# Patient Record
Sex: MALE | Race: WHITE | Employment: FULL TIME | URBAN - METROPOLITAN AREA
[De-identification: names, ages, dates, MRNs, and addresses within clinical notes are randomized per-mention and may not be internally consistent; named-entity substitution may affect disease eponyms.]

---

## 2018-11-09 ENCOUNTER — HOSPITAL ENCOUNTER (EMERGENCY)
Age: 33
Discharge: HOME | End: 2018-11-09
Payer: COMMERCIAL

## 2018-11-09 VITALS
OXYGEN SATURATION: 98 % | TEMPERATURE: 98.5 F | HEART RATE: 84 BPM | RESPIRATION RATE: 16 BRPM | WEIGHT: 162 LBS | BODY MASS INDEX: 23.99 KG/M2 | SYSTOLIC BLOOD PRESSURE: 140 MMHG | HEIGHT: 69 IN | DIASTOLIC BLOOD PRESSURE: 94 MMHG

## 2018-11-09 DIAGNOSIS — S61.511A WRIST LACERATION, RIGHT, INITIAL ENCOUNTER: Primary | ICD-10-CM

## 2018-11-09 DIAGNOSIS — Z23 NEED FOR TETANUS BOOSTER: ICD-10-CM

## 2018-11-09 PROCEDURE — 2500000003 HC RX 250 WO HCPCS: Performed by: NURSE PRACTITIONER

## 2018-11-09 PROCEDURE — 12001 RPR S/N/AX/GEN/TRNK 2.5CM/<: CPT

## 2018-11-09 PROCEDURE — 90471 IMMUNIZATION ADMIN: CPT | Performed by: NURSE PRACTITIONER

## 2018-11-09 PROCEDURE — 90715 TDAP VACCINE 7 YRS/> IM: CPT | Performed by: NURSE PRACTITIONER

## 2018-11-09 PROCEDURE — 6360000002 HC RX W HCPCS: Performed by: NURSE PRACTITIONER

## 2018-11-09 PROCEDURE — 99282 EMERGENCY DEPT VISIT SF MDM: CPT

## 2018-11-09 PROCEDURE — 6370000000 HC RX 637 (ALT 250 FOR IP)

## 2018-11-09 RX ORDER — DIAPER,BRIEF,INFANT-TODD,DISP
EACH MISCELLANEOUS
Status: COMPLETED
Start: 2018-11-09 | End: 2018-11-09

## 2018-11-09 RX ORDER — LIDOCAINE HYDROCHLORIDE 10 MG/ML
20 INJECTION, SOLUTION INFILTRATION; PERINEURAL ONCE
Status: COMPLETED | OUTPATIENT
Start: 2018-11-09 | End: 2018-11-09

## 2018-11-09 RX ORDER — DIAPER,BRIEF,INFANT-TODD,DISP
EACH MISCELLANEOUS ONCE
Status: COMPLETED | OUTPATIENT
Start: 2018-11-09 | End: 2018-11-09

## 2018-11-09 RX ADMIN — Medication 1 G: at 16:34

## 2018-11-09 RX ADMIN — TETANUS TOXOID, REDUCED DIPHTHERIA TOXOID AND ACELLULAR PERTUSSIS VACCINE, ADSORBED 0.5 ML: 5; 2.5; 8; 8; 2.5 SUSPENSION INTRAMUSCULAR at 15:27

## 2018-11-09 RX ADMIN — BACITRACIN ZINC 1 G: 500 OINTMENT TOPICAL at 16:34

## 2018-11-09 RX ADMIN — LIDOCAINE HYDROCHLORIDE 20 ML: 10 INJECTION, SOLUTION INFILTRATION; PERINEURAL at 15:27

## 2018-11-09 ASSESSMENT — PAIN SCALES - GENERAL: PAINLEVEL_OUTOF10: 0

## 2018-12-14 ENCOUNTER — APPOINTMENT (OUTPATIENT)
Dept: GENERAL RADIOLOGY | Age: 33
End: 2018-12-14
Payer: COMMERCIAL

## 2018-12-14 ENCOUNTER — HOSPITAL ENCOUNTER (EMERGENCY)
Age: 33
Discharge: HOME | End: 2018-12-14
Attending: EMERGENCY MEDICINE
Payer: COMMERCIAL

## 2018-12-14 VITALS
TEMPERATURE: 98.1 F | HEIGHT: 69 IN | OXYGEN SATURATION: 98 % | WEIGHT: 155 LBS | SYSTOLIC BLOOD PRESSURE: 108 MMHG | HEART RATE: 86 BPM | BODY MASS INDEX: 22.96 KG/M2 | RESPIRATION RATE: 16 BRPM | DIASTOLIC BLOOD PRESSURE: 64 MMHG

## 2018-12-14 DIAGNOSIS — S29.019A THORACIC MYOFASCIAL STRAIN, INITIAL ENCOUNTER: Primary | ICD-10-CM

## 2018-12-14 PROCEDURE — 99283 EMERGENCY DEPT VISIT LOW MDM: CPT

## 2018-12-14 PROCEDURE — 71045 X-RAY EXAM CHEST 1 VIEW: CPT

## 2018-12-14 PROCEDURE — 6370000000 HC RX 637 (ALT 250 FOR IP): Performed by: EMERGENCY MEDICINE

## 2018-12-14 RX ORDER — ACETAMINOPHEN 500 MG
500 TABLET ORAL EVERY 6 HOURS PRN
Qty: 120 TABLET | Refills: 3 | Status: SHIPPED | OUTPATIENT
Start: 2018-12-14 | End: 2019-11-20 | Stop reason: ALTCHOICE

## 2018-12-14 RX ORDER — IBUPROFEN 800 MG/1
800 TABLET ORAL EVERY 6 HOURS PRN
Qty: 20 TABLET | Refills: 3 | Status: SHIPPED | OUTPATIENT
Start: 2018-12-14 | End: 2019-11-20 | Stop reason: ALTCHOICE

## 2018-12-14 RX ORDER — CYCLOBENZAPRINE HCL 10 MG
10 TABLET ORAL 3 TIMES DAILY PRN
Qty: 10 TABLET | Refills: 0 | Status: SHIPPED | OUTPATIENT
Start: 2018-12-14 | End: 2018-12-24

## 2018-12-14 RX ORDER — IBUPROFEN 800 MG/1
800 TABLET ORAL ONCE
Status: COMPLETED | OUTPATIENT
Start: 2018-12-14 | End: 2018-12-14

## 2018-12-14 RX ORDER — ACETAMINOPHEN 500 MG
1000 TABLET ORAL ONCE
Status: COMPLETED | OUTPATIENT
Start: 2018-12-14 | End: 2018-12-14

## 2018-12-14 RX ADMIN — IBUPROFEN 800 MG: 800 TABLET, FILM COATED ORAL at 08:19

## 2018-12-14 RX ADMIN — ACETAMINOPHEN 1000 MG: 500 TABLET ORAL at 08:19

## 2018-12-14 ASSESSMENT — ENCOUNTER SYMPTOMS
COUGH: 0
BOWEL INCONTINENCE: 0
DIARRHEA: 0
BACK PAIN: 1
CONSTIPATION: 0
VOMITING: 0
BLOOD IN STOOL: 0
SHORTNESS OF BREATH: 0
WHEEZING: 0
NAUSEA: 0
ABDOMINAL PAIN: 0

## 2018-12-14 ASSESSMENT — PAIN DESCRIPTION - FREQUENCY: FREQUENCY: CONTINUOUS

## 2018-12-14 ASSESSMENT — PAIN SCALES - GENERAL
PAINLEVEL_OUTOF10: 7
PAINLEVEL_OUTOF10: 7

## 2018-12-14 ASSESSMENT — PAIN DESCRIPTION - ORIENTATION: ORIENTATION: RIGHT

## 2018-12-14 ASSESSMENT — PAIN DESCRIPTION - PROGRESSION: CLINICAL_PROGRESSION: NOT CHANGED

## 2018-12-14 ASSESSMENT — PAIN DESCRIPTION - LOCATION: LOCATION: BACK

## 2018-12-14 ASSESSMENT — PAIN DESCRIPTION - PAIN TYPE: TYPE: ACUTE PAIN

## 2018-12-14 ASSESSMENT — PAIN DESCRIPTION - ONSET: ONSET: SUDDEN

## 2018-12-14 NOTE — ED PROVIDER NOTES
reviewed and are negative. Physical Exam   Constitutional: He is oriented to person, place, and time. He appears well-developed and well-nourished. HENT:   Head: Normocephalic and atraumatic. Eyes: Pupils are equal, round, and reactive to light. Neck: Normal range of motion. Neck supple. Cardiovascular: Normal rate, regular rhythm, normal heart sounds and intact distal pulses. No murmur heard. Pulmonary/Chest: Effort normal and breath sounds normal. No respiratory distress. He has no wheezes. He has no rales. Abdominal: Soft. Bowel sounds are normal. There is no tenderness. There is no rebound and no guarding. Musculoskeletal: He exhibits no edema. Neurological: He is alert and oriented to person, place, and time. Skin: Skin is warm and dry. Capillary refill takes less than 2 seconds. Nursing note and vitals reviewed. Wells' Criteria for PE (possible score 0 to 12.5)       Clinical Signs & Symptoms of DVT   No      PE is #1 Dx or Equally Likely   No      Heart Rate >100   No      Immobillization at least 3 days or     Surgery in past 4 Weeks   No      Previous Diagnosed PE or DVT   No      Hemoptysis   No      Malignancy w/Tx in Past 6 Months or     Palliative Tx   No      TOTAL SCORE   0     Low Risk Group: 0-1.5 =  1.3-3 % incidence of PE  Mod Risk Group: 2-3.5 =  16.2 % Incidence of PE  Mod Risk Group: > 4 = 28% Incidence of PE  High Risk Group >5 = 40.6 Incidence of      Procedures  --------------------------------------------- PAST HISTORY ---------------------------------------------  Past Medical History:  has no past medical history on file. Past Surgical History:  has a past surgical history that includes Wrist surgery and fracture surgery. Social History:  reports that he has been smoking. He has been smoking about 0.20 packs per day. He has never used smokeless tobacco. He reports that he drinks alcohol. He reports that he does not use drugs.     Family History: family history is not on file. The patients home medications have been reviewed. Allergies: Patient has no known allergies. -------------------------------------------------- RESULTS -------------------------------------------------  Labs:  No results found for this visit on 12/14/18. Radiology:  XR CHEST PORTABLE   Final Result   normal chest unchanged since May 21, 2008.                         ------------------------- NURSING NOTES AND VITALS REVIEWED ---------------------------  Date / Time Roomed:  12/14/2018  6:51 AM  ED Bed Assignment:  12/12    The nursing notes within the ED encounter and vital signs as below have been reviewed. /64   Pulse 86   Temp 98.1 °F (36.7 °C) (Oral)   Resp 16   Ht 5' 9\" (1.753 m)   Wt 155 lb (70.3 kg)   SpO2 98%   BMI 22.89 kg/m²   Oxygen Saturation Interpretation: Normal      ------------------------------------------ PROGRESS NOTES ------------------------------------------  ED Course as of Dec 14 0803   Fri Dec 14, 2018   0802 Patient has myofascial thoracic strain from right side of the back. Chest x-ray showed no abnormality. Patient was given strength anti-inflammatories for pain. We discharged with the same plus muscle relaxer to use at night if the pain is so bad that he cannot sleep. Instructed to follow-up with corporate care as this was a work-related injury. The patient is nontoxic appearing and stable for outpatient treatment and management. They were instructed to follow-up with their primary care physician and were given warning signs to return to the ED. All of their questions were answered at this time and are agreeable with discharge and early follow up. [BM]      ED Course User Index  [BM] Brian Krueger,          8:00 AM  I have spoken with the patient and discussed todays results, in addition to providing specific details for the plan of care and counseling regarding the diagnosis and prognosis.   Their questions are answered at this

## 2019-11-20 ENCOUNTER — TELEPHONE (OUTPATIENT)
Dept: ADMINISTRATIVE | Age: 34
End: 2019-11-20

## 2019-11-20 ENCOUNTER — HOSPITAL ENCOUNTER (EMERGENCY)
Age: 34
Discharge: HOME | End: 2019-11-20
Attending: EMERGENCY MEDICINE
Payer: COMMERCIAL

## 2019-11-20 ENCOUNTER — APPOINTMENT (OUTPATIENT)
Dept: GENERAL RADIOLOGY | Age: 34
End: 2019-11-20
Payer: COMMERCIAL

## 2019-11-20 VITALS
HEIGHT: 69 IN | BODY MASS INDEX: 24.44 KG/M2 | OXYGEN SATURATION: 99 % | SYSTOLIC BLOOD PRESSURE: 132 MMHG | TEMPERATURE: 98.3 F | HEART RATE: 90 BPM | WEIGHT: 165 LBS | DIASTOLIC BLOOD PRESSURE: 72 MMHG | RESPIRATION RATE: 16 BRPM

## 2019-11-20 DIAGNOSIS — S46.912A STRAIN OF LEFT SHOULDER, INITIAL ENCOUNTER: Primary | ICD-10-CM

## 2019-11-20 PROCEDURE — 73030 X-RAY EXAM OF SHOULDER: CPT

## 2019-11-20 PROCEDURE — 99283 EMERGENCY DEPT VISIT LOW MDM: CPT

## 2020-02-26 ENCOUNTER — OFFICE VISIT (OUTPATIENT)
Dept: FAMILY MEDICINE CLINIC | Age: 35
End: 2020-02-26
Payer: COMMERCIAL

## 2020-02-26 VITALS
RESPIRATION RATE: 18 BRPM | SYSTOLIC BLOOD PRESSURE: 112 MMHG | HEIGHT: 69 IN | TEMPERATURE: 98.2 F | BODY MASS INDEX: 26.87 KG/M2 | WEIGHT: 181.4 LBS | HEART RATE: 84 BPM | DIASTOLIC BLOOD PRESSURE: 70 MMHG | OXYGEN SATURATION: 99 %

## 2020-02-26 PROCEDURE — 96372 THER/PROPH/DIAG INJ SC/IM: CPT | Performed by: FAMILY MEDICINE

## 2020-02-26 PROCEDURE — 99203 OFFICE O/P NEW LOW 30 MIN: CPT | Performed by: FAMILY MEDICINE

## 2020-02-26 RX ORDER — NAPROXEN 500 MG/1
500 TABLET ORAL 2 TIMES DAILY WITH MEALS
Qty: 180 TABLET | Refills: 1 | Status: SHIPPED
Start: 2020-02-26 | End: 2020-05-26

## 2020-02-26 RX ORDER — METHYLPREDNISOLONE 4 MG/1
TABLET ORAL
Qty: 1 KIT | Refills: 0 | Status: SHIPPED | OUTPATIENT
Start: 2020-02-26 | End: 2020-03-03

## 2020-02-26 RX ORDER — DEXAMETHASONE SODIUM PHOSPHATE 4 MG/ML
4 INJECTION, SOLUTION INTRA-ARTICULAR; INTRALESIONAL; INTRAMUSCULAR; INTRAVENOUS; SOFT TISSUE ONCE
Status: COMPLETED | OUTPATIENT
Start: 2020-02-26 | End: 2020-02-26

## 2020-02-26 RX ADMIN — DEXAMETHASONE SODIUM PHOSPHATE 4 MG: 4 INJECTION, SOLUTION INTRA-ARTICULAR; INTRALESIONAL; INTRAMUSCULAR; INTRAVENOUS; SOFT TISSUE at 15:59

## 2020-02-26 ASSESSMENT — PATIENT HEALTH QUESTIONNAIRE - PHQ9
2. FEELING DOWN, DEPRESSED OR HOPELESS: 0
SUM OF ALL RESPONSES TO PHQ9 QUESTIONS 1 & 2: 0
1. LITTLE INTEREST OR PLEASURE IN DOING THINGS: 0
SUM OF ALL RESPONSES TO PHQ QUESTIONS 1-9: 0
SUM OF ALL RESPONSES TO PHQ QUESTIONS 1-9: 0

## 2020-02-26 ASSESSMENT — ENCOUNTER SYMPTOMS
DIARRHEA: 0
EYE DISCHARGE: 0
TROUBLE SWALLOWING: 0
COUGH: 0
COLOR CHANGE: 0
NAUSEA: 0
VOICE CHANGE: 0
PHOTOPHOBIA: 0
BACK PAIN: 0
VOMITING: 0
EYE REDNESS: 0
CHOKING: 0
GASTROINTESTINAL NEGATIVE: 1
EYE PAIN: 0
SHORTNESS OF BREATH: 0
CONSTIPATION: 0
EYE ITCHING: 0
ALLERGIC/IMMUNOLOGIC NEGATIVE: 1
SINUS PRESSURE: 0
APNEA: 0
RECTAL PAIN: 0
STRIDOR: 0
SINUS PAIN: 0
SORE THROAT: 0
ABDOMINAL DISTENTION: 0
CHEST TIGHTNESS: 0
RESPIRATORY NEGATIVE: 1
BLOOD IN STOOL: 0
WHEEZING: 0
FACIAL SWELLING: 0
ANAL BLEEDING: 0
RHINORRHEA: 0
ABDOMINAL PAIN: 0

## 2020-02-26 NOTE — PATIENT INSTRUCTIONS
Patient Education        Rotator Cuff Rehabilitation  What is rotator cuff rehabilitation? Rotator cuff rehabilitation is a series of exercises you do after your surgery. It helps you get back your shoulder's range of motion and strength. You will work with your doctor and physical therapist to plan this exercise program. To get the best results, you need to do the exercises correctly and as often as your doctor tells you. Before you start any exercises, talk with your doctor or physical therapist. It is important that you know exactly how to do the exercises. Stop and call your doctor if you are not sure that you are doing the exercises correctly or if you have any pain. Hearing clicks and pops during exercise is not always cause for concern, but a grinding feeling may mean a more serious problem. Ice your shoulder after exercising if it is sore. Follow-up care is a key part of your treatment and safety. Be sure to make and go to all appointments, and call your doctor if you are having problems. It's also a good idea to know your test results and keep a list of the medicines you take. Stretching exercises  Do not start doing stretching exercises until your doctor says you can. Your doctor will tell you which exercises to do, and how often and how long to do them. Posterior stretch  · Stand upright with your feet shoulder-width apart. · Put the hand of your affected arm on the opposite shoulder, and hold the elbow to your body. · Then, using your good arm, hold the elbow of your affected arm and move it gently up, away from, and across your body. External rotation  · Hold a lightweight stick or niyah in your good arm. It should be about 2 feet long. A curtain niyah may work well. · Lie on your back with your elbows next to your sides. Rest the elbow of your affected arm on a small pillow or folded towel. · Set your arms so that the elbows are bent at a 90-degree angle, like the letter \"L. \" Your hands will 12 times. 4. When you first start out, don't hold any extra weight in your hand. As you get stronger, you may use a 1-pound to 2-pound dumbbell or a small can of food. Shoulder flexor and extensor exercise   1. Push forward (flex): Stand facing a wall or doorjamb, about 6 inches or less back. Hold your injured arm against your body. Make a closed fist with your thumb on top. Then gently push your hand forward into the wall with about 25% to 50% of your strength. Don't let your body move backward as you push. Hold for about 6 seconds. Relax for a few seconds. Repeat 8 to 12 times. 2. Push backward (extend): Stand with your back flat against a wall. Your upper arm should be against the wall, with your elbow bent 90 degrees (your hand straight ahead). Push your elbow gently back against the wall with about 25% to 50% of your strength. Don't let your body move forward as you push. Hold for about 6 seconds. Relax for a few seconds. Repeat 8 to 12 times. Scapular exercise: Wall push-ups   1. Stand facing a wall, about 12 inches to 18 inches away. 2. Place your hands on the wall at shoulder height. 3. Slowly bend your elbows and bring your face to the wall. Keep your back and hips straight. 4. Push back to where you started. 5. Repeat 8 to 12 times. 6. When you can do this exercise against a wall comfortably, you can try it against a counter. You can then slowly progress to the end of a couch, then to a sturdy chair, and finally to the floor. Scapular exercise: Retraction   1. Put the band around a solid object at about waist level. (A bedpost will work well.) Each hand should hold an end of the band. 2. With your elbows at your sides and bent to 90 degrees, pull the band back. Your shoulder blades should move toward each other. Then move your arms back where you started. 3. Repeat 8 to 12 times.   4. If you have good range of motion in your shoulders, try this exercise with your arms lifted out to the sides. Keep your elbows at a 90-degree angle. Raise the elastic band up to about shoulder level. Pull the band back to move your shoulder blades toward each other. Then move your arms back where you started. Internal rotator strengthening exercise   1. Start by tying a piece of elastic exercise material to a doorknob. You can use surgical tubing or Thera-Band. 2. Stand or sit with your shoulder relaxed and your elbow bent 90 degrees. Your upper arm should rest comfortably against your side. Squeeze a rolled towel between your elbow and your body for comfort. This will help keep your arm at your side. 3. Hold one end of the elastic band in the hand of the painful arm. 4. Slowly rotate your forearm toward your body until it touches your belly. Slowly move it back to where you started. 5. Keep your elbow and upper arm firmly tucked against the towel roll or at your side. 6. Repeat 8 to 12 times. External rotator strengthening exercise   1. Start by tying a piece of elastic exercise material to a doorknob. You can use surgical tubing or Thera-Band. (You may also hold one end of the band in each hand.)  2. Stand or sit with your shoulder relaxed and your elbow bent 90 degrees. Your upper arm should rest comfortably against your side. Squeeze a rolled towel between your elbow and your body for comfort. This will help keep your arm at your side. 3. Hold one end of the elastic band with the hand of the painful arm. 4. Start with your forearm across your belly. Slowly rotate the forearm out away from your body. Keep your elbow and upper arm tucked against the towel roll or the side of your body until you begin to feel tightness in your shoulder. Slowly move your arm back to where you started. 5. Repeat 8 to 12 times. Follow-up care is a key part of your treatment and safety. Be sure to make and go to all appointments, and call your doctor if you are having problems.  It's also a good idea to know your test results and keep a list of the medicines you take. Where can you learn more? Go to https://chpepiceweb.[email or web address]. org and sign in to your [employer] account. Enter Boom Phelps in the [facility] box to learn more about \"Rotator Cuff: Exercises. \"     If you do not have an account, please click on the \"Sign Up Now\" link. Current as of: June 26, 2019  Content Version: 12.3  © 3585-5325 Healthwise, [employer]. Care instructions adapted under license by [facility] ([facility]). If you have questions about a medical condition or this instruction, always ask your healthcare professional. [place] [address] any warranty or liability for your use of this information. Patient Education        Lactose-Restricted Diet: Care Instructions  Your Care Instructions    Lactose is a sugar that is in milk and milk products. Some people do not make enough of an enzyme called lactase, which digests lactose. When this happens it can cause gas, belly pain, diarrhea, and bloating. This is called lactose intolerance. This is not the same as food allergy to milk. With planning, you can avoid lactose and still eat a tasty and nutritious diet and get enough calcium to maintain healthy bones. Your doctor and dietitian will help you design a diet based on your level of lactose intolerance and what you like to eat. Always talk with your doctor or dietitian before you make changes in your diet. Follow-up care is a key part of your treatment and safety. Be sure to make and go to all appointments, and call your doctor if you are having problems. It's also a good idea to know your test results and keep a list of the medicines you take. How can you care for yourself at home? · Limit the amount of milk and milk products in your diet. Spread small amounts of milk or milk products throughout the day, instead of larger amounts all at once.   ? If you have bad symptoms when you eat or drink something with lactose, you may need have bones you can eat. ? Calcium-fortified orange juice. ? Soy products such as fortified soy milk and tofu. ? Almonds. ? Dried beans. · If you are worried about getting enough nutrients, ask your doctor about taking supplements, such as calcium and vitamin D. When should you call for help? Call your doctor now or seek immediate medical care if:    · You have new or worse belly pain.    Watch closely for changes in your health, and be sure to contact your doctor if:    · You do not get better as expected. Where can you learn more? Go to https://WithingspeIntegrated [email or web address].[email or web address] org and sign in to your [employer] account. Enter Y809 in the [employer] box to learn more about \"Lactose-Restricted Diet: Care Instructions. \"     If you do not have an account, please click on the \"Sign Up Now\" link. Current as of: April 7, 2019  Content Version: 12.3  © 6984-2365 Healthwise, Inc[employer]. Care instructions adapted under license by [facility] ([facility]). If you have questions about a medical condition or this instruction, always ask your healthcare professional. [place] [address] any warranty or liability for your use of this information.

## 2020-02-26 NOTE — PROGRESS NOTES
Susana Farias  [address]  [place] [address]     Dr. Cristobal Heredia  [address] [place] [address]  ** Report to GI Services Building, Entrance F, Behind [facility]  ** Please call [phone] with any questions        Please follow these instructions. Disregard instructions that are provided on the medication package.    Date of Procedure: Wednesday, August 8, 2018  Check in at: 9:45am  Procedure at: 10:45am    Your laxative prescription (NuLytely) may be picked up from:  [facility]    Colonoscopy Preparation Instructions  ?  **Please make arrangements for a responsible adult to drive you home. (A responsible adult is someone age 18 or older who can receive and understand instructions, stay with you, and call for assistance as instructed).**    Regarding Your Medications Prior to Your Procedure:  • Do not take any iron or iron-containing multivitamins beginning five days prior to your procedure.    • Blood-thinners typically need to be stopped a few days prior to the procedure. Check with your prescribing physician if you take blood thinners such as Coumadin (warfarin), Pradaxa, Plavix, Eliquis, Xarelto or Brilinta.  • Do not take aspirin or anti-inflammatory medications (Advil, Motrin, ibuprofen, Aleve, naproxen), in the morning on the day of your procedure.  • If you are diabetic:  o Take half of your usual dose of insulin the night before. DO NOT take insulin the morning of the procedure.  o Hold oral diabetic medications the night before and the morning of the procedure.     The Day Before the Procedure:  • Start a clear liquid diet at breakfast. Continue a clear liquid diet for the entire day in unlimited amounts. Clear liquids are listed below.   • In the morning, add tap water to the laxative container, shake well and place in the refrigerator.   • Lemonade flavor Crystal Lite mix, obtained at the grocery store, can also be used to improve the flavor. Once water is  FLORESITA Licona is a 36 y.o. male. HPI/Chief C/O:  Chief Complaint   Patient presents with    Shoulder Pain     New patient; pt c/o L shoulder pain; would like to discuss second options incase workers comp gets denied      No Known Allergies  This patient is here to establish care as a new patient in our office  We will review all past medical history and go over past and current medications   We will review all medical records that are available to us  We will reconcile our care planning and treatment goals to past and present for this patient       Shoulder Pain    The pain is present in the left shoulder. This is a new problem. The current episode started more than 1 month ago. The problem occurs constantly. The problem has been gradually worsening. The quality of the pain is described as sharp, burning and aching. The pain is at a severity of 9/10. The pain is severe. Associated symptoms include an inability to bear weight and a limited range of motion. Pertinent negatives include no fever, itching, joint locking, joint swelling, numbness, stiffness or tingling. The symptoms are aggravated by activity. He has tried NSAIDS for the symptoms. Family history does not include gout or rheumatoid arthritis. There is no history of diabetes, gout, osteoarthritis or rheumatoid arthritis. ROS:  Review of Systems   Constitutional: Negative. Negative for activity change, appetite change, chills, diaphoresis, fatigue, fever and unexpected weight change. HENT: Negative. Negative for congestion, dental problem, drooling, ear discharge, ear pain, facial swelling, hearing loss, mouth sores, nosebleeds, postnasal drip, rhinorrhea, sinus pressure, sinus pain, sneezing, sore throat, tinnitus, trouble swallowing and voice change. Eyes: Negative for photophobia, pain, discharge, redness, itching and visual disturbance. Respiratory: Negative.   Negative for apnea, cough, choking, chest tightness, added, the solution is only good for 48 hours.   • At approximately 5 PM, (or when you are home for the evening), begin drinking the Nulytely solution. Drink 8 oz. Every 20 minutes until you have consumed the entire gallon of the solution.   • Walking will help the laxative move through the colon.     The Day of the Procedure:  • DO NOT TAKE AM MEDICATIONS  • Do not have anything by mouth after 8:45 AM.     Clear Liquid Diet:  Do not consume anything red or purple.    Beverages: soft drinks/soda, Gatorade or Fede-Aid, clear fruit juices without pulp, water, tea, coffee (no milk or non dairy creamer).   Broths: chicken, beef or vegetable.   Desserts: hard candies, Jell-O, Popsicles. (No fruit bars or sherbet)    If stools are not clear yellow the morning of the procedure, please call the GI lab at [phone].            wall: No tenderness. Abdominal:      General: Bowel sounds are normal. There is no distension. Palpations: Abdomen is soft. There is no mass. Tenderness: There is no abdominal tenderness. There is no right CVA tenderness, left CVA tenderness, guarding or rebound. Hernia: No hernia is present. Genitourinary:     Penis: Normal. No tenderness. Scrotum/Testes: Normal.      Prostate: Normal.      Rectum: Normal.   Musculoskeletal:         General: Tenderness (left shoulder pain) present. No swelling, deformity or signs of injury. Left shoulder: He exhibits decreased range of motion, tenderness, bony tenderness, pain, spasm and decreased strength. He exhibits no swelling, no effusion, no crepitus, no deformity, no laceration and normal pulse. Arms:       Right lower leg: No edema. Left lower leg: No edema. Lymphadenopathy:      Cervical: No cervical adenopathy. Skin:     General: Skin is warm. Coloration: Skin is not jaundiced or pale. Findings: No bruising, erythema, lesion or rash. Neurological:      General: No focal deficit present. Mental Status: He is alert and oriented to person, place, and time. Cranial Nerves: No cranial nerve deficit. Sensory: No sensory deficit. Motor: No weakness or abnormal muscle tone. Coordination: Coordination normal.      Gait: Gait normal.      Deep Tendon Reflexes: Reflexes are normal and symmetric. Reflexes normal.   Psychiatric:         Mood and Affect: Mood normal.         Behavior: Behavior normal.         Thought Content: Thought content normal.         Judgment: Judgment normal.         ASSESSMENT/PLAN  Ethan Schuster was seen today for shoulder pain. Diagnoses and all orders for this visit:    Rotator cuff disorder, left  -     dexamethasone (DECADRON) injection 4 mg  -     methylPREDNISolone (MEDROL DOSEPACK) 4 MG tablet; Take as directed  -     naproxen (NAPROSYN) 500 MG tablet;  Take 1 tablet by mouth 2 times daily (with meals)  -     Comprehensive Metabolic Panel; Future  -     CBC Auto Differential; Future  -     MRI SHOULDER LEFT WO CONTRAST; Future  -     [facility], Dustin Gonzalez  --PLAN--inject left shoulder  x 2                1/2 cc xylocaine plus 1 cc depo medrol                Omt/ultra--Rx        IFG (impaired fasting glucose)  -     Comprehensive Metabolic Panel; Future  -     CBC Auto Differential; Future  -     Hemoglobin A1C; Future    Dyslipidemia  -     Comprehensive Metabolic Panel; Future  -     Lipid Panel; Future  -     CBC Auto Differential; Future    Fatigue, unspecified type  -     TSH without Reflex; Future  -     Comprehensive Metabolic Panel; Future  -     Uric Acid; Future  -     CBC Auto Differential; Future        Outpatient Encounter Medications as of 2020   Medication Sig Dispense Refill    methylPREDNISolone (MEDROL DOSEPACK) 4 MG tablet Take as directed 1 kit 0    naproxen (NAPROSYN) 500 MG tablet Take 1 tablet by mouth 2 times daily (with meals) 180 tablet 1    [] dexamethasone (DECADRON) injection 4 mg        No facility-administered encounter medications on file as of 2020. No follow-ups on file.         Reviewed recent labs related to Lior's current problems      Discussed importance of regular Health Maintenance follow up  Health Maintenance   Topic    Varicella vaccine (1 of 2 - 2-dose childhood series)    Pneumococcal 0-64 years Vaccine (1 of 1 - PPSV23)    HIV screen     Flu vaccine (1)    DTaP/Tdap/Td vaccine (2 - Td)    Shingles Vaccine (1 of 2)    Hepatitis A vaccine     Hepatitis B vaccine     Hib vaccine     Meningococcal (ACWY) vaccine

## 2020-03-04 ENCOUNTER — HOSPITAL ENCOUNTER (OUTPATIENT)
Dept: MRI IMAGING | Age: 35
Discharge: HOME | End: 2020-03-06
Payer: COMMERCIAL

## 2020-03-04 PROCEDURE — 73221 MRI JOINT UPR EXTREM W/O DYE: CPT

## 2020-03-18 ENCOUNTER — OFFICE VISIT (OUTPATIENT)
Dept: ORTHOPEDIC SURGERY | Age: 35
End: 2020-03-18
Payer: COMMERCIAL

## 2020-03-18 VITALS — HEIGHT: 69 IN | BODY MASS INDEX: 27.4 KG/M2 | WEIGHT: 185 LBS

## 2020-03-18 PROCEDURE — 99204 OFFICE O/P NEW MOD 45 MIN: CPT | Performed by: ORTHOPAEDIC SURGERY

## 2020-03-18 NOTE — PROGRESS NOTES
Chief Complaint   Patient presents with    Consultation    Shoulder Pain     Patient states he is fighting WC cause due to injury at work         HPI:    Patient is 36 y.o. male complaining of left shoulder pain and weakness for 6 months since November 2019. He denies a specific traumatic injury to the left shoulder but felt a large pop in his left arm and shoulder blade after picking up a heavy garbage bin to dispose of as he is a . Patient stated he kept working but noticed gradual weakness and persistent pain especially at night and lifting heavy object especially over his head. Previous treatments include rest, ice, heat, NSAIDs, HEP without much relief. He denies any other orthopedic complaints. ROS:    Skin: (-) rash,(-) psoriasis,(-) eczema, (-)skin cancer. Neurologic: (-)numbness, (-)tingling, (-)headaches, (-) LOC. Cardiovascular: (-) Chest pain, (-) swelling in legs/feet, (-) SOB, (-) cramping in legs/feet with walking. All other review of systems negative except stated above or in HPI      No past medical history on file. Past Surgical History:   Procedure Laterality Date    FRACTURE SURGERY      rigth wrist    WRIST SURGERY      right.        Current Outpatient Medications:     naproxen (NAPROSYN) 500 MG tablet, Take 1 tablet by mouth 2 times daily (with meals) (Patient not taking: Reported on 3/18/2020), Disp: 180 tablet, Rfl: 1  No Known Allergies  Social History     Socioeconomic History    Marital status:      Spouse name: Not on file    Number of children: Not on file    Years of education: Not on file    Highest education level: Not on file   Occupational History    Not on file   Social Needs    Financial resource strain: Not on file    Food insecurity     Worry: Not on file     Inability: Not on file    Transportation needs     Medical: Not on file     Non-medical: Not on file   Tobacco Use    Smoking status: Current Every Day Smoker shows:  There is not a deformity. There is not erythema. There is not soft tissue swelling. Deltoid region is  tender to palpation. AC Joint is  tender to palpation. Clavicle is not tender to palpation. Bicipital Groove is  tender to palpation. Pectoralis  is not tender to palpation. Scapula/ trapezius is  tender to palpation. Right:  ROM Full, Strength: Supraspinatus 5/5, Infraspinatus 5/5, Subscapularis 5/5  Left:  /140/60, Strength: Supraspinatus 4/5, Infraspinatus 5/5, Subscapularis 5/5  Right Shoulder:  Crepitus:  no   Tenderness:  none   Effusion:   none   Impingement: negative   Empty Can:  negative   Speed's:  negative      Apprehension:  negative   Cross Arm Sign:  negative   [place]'s:  negative       Neer's:  negative      Belly Press Test:  negative      Drop Arm Test:  negative     Left Shoulder:  Crepitus:  no   Tenderness:  mild   Effusion:   non   Impingement: positive   Empty Can:  positive   Speed's: positive   Apprehension:  not tested   Cross Arm Sign:  positive   [place]'s:  positive      Neer's:  positive      Belly Press Test:  negative   Drop Arm Test:  positive           Imaging:  Mri Shoulder Left Wo Contrast    Result Date: 3/4/2020  LOCATION: 200 EXAM: MRI SHOULDER LEFT WO CONTRAST COMPARISON: None. HISTORY: Decreased range of motion, pain and spasms. TECHNIQUE: Routine multiplanar multisequence imaging was performed of the left shoulder. No contrast was administered. FINDINGS: Rotator cuff tendons insert normally. The [facility] joint is normal. A small amount of fluid in the subacromial bursa noted. Biceps tendon is not visualized at the level of the groove. Small 2-3 mm paralabral cysts are identified at the posteroinferior labrum likely a posteroinferior labral tear. Normal muscle and bone marrow signal noted. 1. Nondisplaced tear of the posteroinferior labrum. 2. Nonvisualized biceps tendon may be due to a previous rupture.  3. Subacromial bursal fluid possibly

## 2020-05-26 ENCOUNTER — OFFICE VISIT (OUTPATIENT)
Dept: ORTHOPEDIC SURGERY | Age: 35
End: 2020-05-26
Payer: COMMERCIAL

## 2020-05-26 VITALS — BODY MASS INDEX: 27.4 KG/M2 | HEIGHT: 69 IN | WEIGHT: 185 LBS

## 2020-05-26 PROCEDURE — 99214 OFFICE O/P EST MOD 30 MIN: CPT | Performed by: ORTHOPAEDIC SURGERY

## 2020-05-26 NOTE — PROGRESS NOTES
Financial resource strain: Not on file    Food insecurity     Worry: Not on file     Inability: Not on file   [employer] needs     Medical: Not on file     Non-medical: Not on file   Tobacco Use    Smoking status: Current Every Day Smoker     Packs/day: 0.20    Smokeless tobacco: Never Used   Substance and Sexual Activity    Alcohol use: Yes     Comment: occ.  Drug use: No    Sexual activity: Yes     Partners: Female   Lifestyle    Physical activity     Days per week: Not on file     Minutes per session: Not on file    Stress: Not on file   Relationships    Social connections     Talks on phone: Not on file     Gets together: Not on file     Attends Buddhist service: Not on file     Active member of club or organization: Not on file     Attends meetings of clubs or organizations: Not on file     Relationship status: Not on file    Intimate partner violence     Fear of current or ex partner: Not on file     Emotionally abused: Not on file     Physically abused: Not on file     Forced sexual activity: Not on file   Other Topics Concern    Not on file   Social History Narrative    Not on file     No family history on file. Physical Exam:    Ht 5' 9\" (1.753 m)   Wt 185 lb (83.9 kg)   BMI 27.32 kg/m²     GENERAL: alert, appears stated age, cooperative, no acute distress    HEENT: Head is normocephalic, atraumatic. PERRLA. SKIN: Clean, dry, intact. There is not any cellulitis or cutaneous lesions noted in the upper extremities    PULMONARY: breathing is regular and unlabored, no acute distress    CV: The bilateral upper and lower extremities are warm and well-perfused with brisk capillary refill. 2+ pulses UE and LE bilateral.     PSYCHIATRY: Pleasant mood, appropriate behavior, follows commands    NEURO: Sensation is intact distally with light touch with no alteration.  Motor exam of the upper extremities show elbow flexion and extension, wrist flexion and extension, and finger abduction grossly intact 5/5. Upper extremity reflexes are bilaterally symmetrical and within normal limits. LYMPH: No lymphedema present distally in upper or lower extremity. MUSCULOSKELETAL:  Shoulder Exam:  Examination of the Left shoulder shows: There is not a deformity. There is not erythema. There is not soft tissue swelling. Deltoid region is  tender to palpation. AC Joint is  tender to palpation. Clavicle is not tender to palpation. Bicipital Groove is  tender to palpation. Pectoralis  is not tender to palpation. Scapula/ trapezius is  tender to palpation. Right:  ROM Full, Strength: Supraspinatus 5/5, Infraspinatus 5/5, Subscapularis 5/5  Left:  /140/60, Strength: Supraspinatus 4/5, Infraspinatus 5/5, Subscapularis 5/5  Right Shoulder:  Crepitus:  no   Tenderness:  none   Effusion:   none   Impingement: negative   Empty Can:  negative   Speed's:  negative      Apprehension:  negative   Cross Arm Sign:  negative   [place]'s:  negative       Neer's:  negative      Belly Press Test:  negative      Drop Arm Test:  negative     Left Shoulder:  Crepitus:  no   Tenderness:  mild   Effusion:   non   Impingement: positive   Empty Can:  positive   Speed's: positive   Apprehension:  not tested   Cross Arm Sign:  positive   [place]'s:  positive      Neer's:  positive      Belly Press Test:  negative   Drop Arm Test:  positive           Imaging:  Mri Shoulder Left Wo Contrast    Result Date: 3/4/2020  LOCATION: 200 EXAM: MRI SHOULDER LEFT WO CONTRAST COMPARISON: None. HISTORY: Decreased range of motion, pain and spasms. TECHNIQUE: Routine multiplanar multisequence imaging was performed of the left shoulder. No contrast was administered. FINDINGS: Rotator cuff tendons insert normally. The [facility] joint is normal. A small amount of fluid in the subacromial bursa noted. Biceps tendon is not visualized at the level of the groove.  Small 2-3 mm paralabral cysts are identified at the posteroinferior labrum likely a

## 2020-07-09 ENCOUNTER — OFFICE VISIT (OUTPATIENT)
Dept: ORTHOPEDIC SURGERY | Age: 35
End: 2020-07-09
Payer: COMMERCIAL

## 2020-07-09 VITALS — WEIGHT: 180 LBS | TEMPERATURE: 98 F | BODY MASS INDEX: 26.66 KG/M2 | HEIGHT: 69 IN

## 2020-07-09 PROCEDURE — 99214 OFFICE O/P EST MOD 30 MIN: CPT | Performed by: ORTHOPAEDIC SURGERY

## 2020-07-09 NOTE — PROGRESS NOTES
Chief Complaint   Patient presents with    Shoulder Pain     Left Shoulder, F/U         HPI:  Patient is 36 y.o. male complaining of left shoulder pain and weakness for 6 months since November 2019. He denies a specific traumatic injury to the left shoulder but felt a large pop in his left arm and shoulder blade after picking up a heavy garbage bin to dispose of as he is a . Patient stated he kept working but noticed gradual weakness and persistent pain especially at night and lifting heavy object especially over his head. Previous treatments include rest, ice, heat, NSAIDs, HEP without much relief. He denies any other orthopedic complaints. On follow-up today, patient states that his pain is well improved as well as range of motion. Patient states that he was doing home therapy exercises with her friend over the course of the pandemic lockdown and states that he is much improved. However patient states that he feels very shaky and not at full strength just yet at this point    ROS:    Skin: (-) rash,(-) psoriasis,(-) eczema, (-)skin cancer. Neurologic: (-)numbness, (-)tingling, (-)headaches, (-) LOC. Cardiovascular: (-) Chest pain, (-) swelling in legs/feet, (-) SOB, (-) cramping in legs/feet with walking. All other review of systems negative except stated above or in HPI      No past medical history on file. Past Surgical History:   Procedure Laterality Date    FRACTURE SURGERY      rigth wrist    WRIST SURGERY      right. No current outpatient medications on file.   No Known Allergies  Social History     Socioeconomic History    Marital status:      Spouse name: Not on file    Number of children: Not on file    Years of education: Not on file    Highest education level: Not on file   Occupational History    Not on file   Social Needs    Financial resource strain: Not on file    Food insecurity     Worry: Not on file     Inability: Not on file   [employer] needs     Medical: Not on file     Non-medical: Not on file   Tobacco Use    Smoking status: Current Every Day Smoker     Packs/day: 0.20    Smokeless tobacco: Never Used   Substance and Sexual Activity    Alcohol use: Yes     Comment: occ.  Drug use: No    Sexual activity: Yes     Partners: Female   Lifestyle    Physical activity     Days per week: Not on file     Minutes per session: Not on file    Stress: Not on file   Relationships    Social connections     Talks on phone: Not on file     Gets together: Not on file     Attends Jewish service: Not on file     Active member of club or organization: Not on file     Attends meetings of clubs or organizations: Not on file     Relationship status: Not on file    Intimate partner violence     Fear of current or ex partner: Not on file     Emotionally abused: Not on file     Physically abused: Not on file     Forced sexual activity: Not on file   Other Topics Concern    Not on file   Social History Narrative    Not on file     No family history on file. Physical Exam:    Temp 98 °F (36.7 °C)   Ht 5' 9\" (1.753 m)   Wt 180 lb (81.6 kg)   BMI 26.58 kg/m²     GENERAL: alert, appears stated age, cooperative, no acute distress    HEENT: Head is normocephalic, atraumatic. PERRLA. SKIN: Clean, dry, intact. There is not any cellulitis or cutaneous lesions noted in the upper extremities    PULMONARY: breathing is regular and unlabored, no acute distress    CV: The bilateral upper and lower extremities are warm and well-perfused with brisk capillary refill. 2+ pulses UE and LE bilateral.     PSYCHIATRY: Pleasant mood, appropriate behavior, follows commands    NEURO: Sensation is intact distally with light touch with no alteration. Motor exam of the upper extremities show elbow flexion and extension, wrist flexion and extension, and finger abduction grossly intact 5/5.   Upper extremity reflexes are bilaterally symmetrical and within normal limits. LYMPH: No lymphedema present distally in upper or lower extremity. MUSCULOSKELETAL:  Shoulder Exam:  Examination of the Left shoulder shows: There is not a deformity. There is not erythema. There is not soft tissue swelling. Deltoid region is  tender to palpation. AC Joint is  tender to palpation. Clavicle is not tender to palpation. Bicipital Groove is  tender to palpation. Pectoralis  is not tender to palpation. Scapula/ trapezius is  tender to palpation. Right:  ROM Full, Strength: Supraspinatus 5/5, Infraspinatus 5/5, Subscapularis 5/5  Left:  /140/60, Strength: Supraspinatus 4/5, Infraspinatus 5/5, Subscapularis 5/5  Right Shoulder:  Crepitus:  no   Tenderness:  none   Effusion:   none   Impingement: negative   Empty Can:  negative   Speed's:  negative      Apprehension:  negative   Cross Arm Sign:  negative   [place]'s:  negative       Neer's:  negative      Belly Press Test:  negative      Drop Arm Test:  negative     Left Shoulder:  Crepitus:  no   Tenderness:  mild   Effusion:   non   Impingement: positive   Empty Can:  positive   Speed's: positive   Apprehension:  not tested   Cross Arm Sign:  positive   [place]'s:  positive      Neer's:  positive      Belly Press Test:  negative   Drop Arm Test:  positive           Imaging:  Mri Shoulder Left Wo Contrast    Result Date: 3/4/2020  LOCATION: 200 EXAM: MRI SHOULDER LEFT WO CONTRAST COMPARISON: None. HISTORY: Decreased range of motion, pain and spasms. TECHNIQUE: Routine multiplanar multisequence imaging was performed of the left shoulder. No contrast was administered. FINDINGS: Rotator cuff tendons insert normally. The [facility] joint is normal. A small amount of fluid in the subacromial bursa noted. Biceps tendon is not visualized at the level of the groove. Small 2-3 mm paralabral cysts are identified at the posteroinferior labrum likely a posteroinferior labral tear. Normal muscle and bone marrow signal noted.      1. Nondisplaced tear of the posteroinferior labrum. 2. Nonvisualized biceps tendon may be due to a previous rupture. 3. Subacromial bursal fluid possibly bursitis. Sarabjit Watson was seen today for shoulder pain. Diagnoses and all orders for this visit:    Biceps tendon tear    Bursitis of left shoulder          Patient seen and examined. X-rays reviewed and MRI reviewed in detail with patient. Patient did not have a dislocation events and the posterior labral tear appears to be suspect at this time. Patient's pain is more consistent with a proximal biceps tendon tear which is quite unusual for his age. Regardless there is lack of tendon in the groove but also patient complains of catching and locking which may indicate remanent biceps anchor or tendon within the joint itself causing him more pain. Patient also has pain consistent with bursitis of the rotator cuff. Is most likely due to the original injury but also exacerbation of the chronic use and has line of work. Patient will do well with a diagnostic and therapeutic subacromial cortisone injection but he was also educated about possible surgical indications. At this point patient states that his motion has improved greatly but still needs to work on strength which is appropriate. Patient is offered physical therapy but he like to do it on his own and it appears appropriate to stay off work for another 4 to 6 weeks until strength is regained. Milagro Castellano, DO      25 minutes was spent with patient. 50% or greater was spent counseling the patient.

## 2020-08-20 ENCOUNTER — OFFICE VISIT (OUTPATIENT)
Dept: ORTHOPEDIC SURGERY | Age: 35
End: 2020-08-20
Payer: COMMERCIAL

## 2020-08-20 VITALS — WEIGHT: 180 LBS | BODY MASS INDEX: 26.66 KG/M2 | HEIGHT: 69 IN

## 2020-08-20 PROCEDURE — 20610 DRAIN/INJ JOINT/BURSA W/O US: CPT | Performed by: ORTHOPAEDIC SURGERY

## 2020-08-20 PROCEDURE — 99214 OFFICE O/P EST MOD 30 MIN: CPT | Performed by: ORTHOPAEDIC SURGERY

## 2020-08-20 RX ORDER — TRIAMCINOLONE ACETONIDE 40 MG/ML
40 INJECTION, SUSPENSION INTRA-ARTICULAR; INTRAMUSCULAR ONCE
Status: COMPLETED | OUTPATIENT
Start: 2020-08-20 | End: 2020-08-20

## 2020-08-20 RX ADMIN — TRIAMCINOLONE ACETONIDE 40 MG: 40 INJECTION, SUSPENSION INTRA-ARTICULAR; INTRAMUSCULAR at 13:35

## 2020-08-20 NOTE — PROGRESS NOTES
Chief Complaint   Patient presents with    Shoulder Pain     Left shoulder follow up. He states it feels about the same as last visit. C/o shoulder getting stuck while in certain positions. Not sure he has the strength to lift to go back to work. HPI:  Patient is 36 y.o. male complaining of left shoulder pain and weakness for 6 months since November 2019. He denies a specific traumatic injury to the left shoulder but felt a large pop in his left arm and shoulder blade after picking up a heavy garbage bin to dispose of as he is a . Patient stated he kept working but noticed gradual weakness and persistent pain especially at night and lifting heavy object especially over his head. Previous treatments include rest, ice, heat, NSAIDs, HEP without much relief. He denies any other orthopedic complaints. On follow-up today, patient states that his pain is well improved as well as range of motion. Patient states that he was doing home therapy exercises with her friend over the course of the pandemic lockdown and states that he is much improved. However patient states that he feels very shaky and not at full strength just yet at this point    ROS:    Skin: (-) rash,(-) psoriasis,(-) eczema, (-)skin cancer. Neurologic: (-)numbness, (-)tingling, (-)headaches, (-) LOC. Cardiovascular: (-) Chest pain, (-) swelling in legs/feet, (-) SOB, (-) cramping in legs/feet with walking. All other review of systems negative except stated above or in HPI      No past medical history on file. Past Surgical History:   Procedure Laterality Date    FRACTURE SURGERY      rigth wrist    WRIST SURGERY      right. No current outpatient medications on file.   No Known Allergies  Social History     Socioeconomic History    Marital status:      Spouse name: Not on file    Number of children: Not on file    Years of education: Not on file    Highest education level: Not on file   Occupational History    Not on file   Social Needs    Financial resource strain: Not on file    Food insecurity     Worry: Not on file     Inability: Not on file    Transportation needs     Medical: Not on file     Non-medical: Not on file   Tobacco Use    Smoking status: Current Every Day Smoker     Packs/day: 0.20    Smokeless tobacco: Never Used   Substance and Sexual Activity    Alcohol use: Yes     Comment: occ.  Drug use: No    Sexual activity: Yes     Partners: Female   Lifestyle    Physical activity     Days per week: Not on file     Minutes per session: Not on file    Stress: Not on file   Relationships    Social connections     Talks on phone: Not on file     Gets together: Not on file     Attends Evangelical service: Not on file     Active member of club or organization: Not on file     Attends meetings of clubs or organizations: Not on file     Relationship status: Not on file    Intimate partner violence     Fear of current or ex partner: Not on file     Emotionally abused: Not on file     Physically abused: Not on file     Forced sexual activity: Not on file   Other Topics Concern    Not on file   Social History Narrative    Not on file     No family history on file. Physical Exam:    Ht 5' 9\" (1.753 m)   Wt 180 lb (81.6 kg)   BMI 26.58 kg/m²     GENERAL: alert, appears stated age, cooperative, no acute distress    HEENT: Head is normocephalic, atraumatic. PERRLA. SKIN: Clean, dry, intact. There is not any cellulitis or cutaneous lesions noted in the upper extremities    PULMONARY: breathing is regular and unlabored, no acute distress    CV: The bilateral upper and lower extremities are warm and well-perfused with brisk capillary refill. 2+ pulses UE and LE bilateral.     PSYCHIATRY: Pleasant mood, appropriate behavior, follows commands    NEURO: Sensation is intact distally with light touch with no alteration.  Motor exam of the upper extremities show elbow flexion and extension, wrist flexion and extension, and finger abduction grossly intact 5/5. Upper extremity reflexes are bilaterally symmetrical and within normal limits. LYMPH: No lymphedema present distally in upper or lower extremity. MUSCULOSKELETAL:  Shoulder Exam:  Examination of the Left shoulder shows: There is not a deformity. There is not erythema. There is not soft tissue swelling. Deltoid region is  tender to palpation. AC Joint is  tender to palpation. Clavicle is not tender to palpation. Bicipital Groove is  tender to palpation. Pectoralis  is not tender to palpation. Scapula/ trapezius is  tender to palpation. Right:  ROM Full, Strength: Supraspinatus 5/5, Infraspinatus 5/5, Subscapularis 5/5  Left:  /140/60, Strength: Supraspinatus 4/5, Infraspinatus 5/5, Subscapularis 5/5  Right Shoulder:  Crepitus:  no   Tenderness:  none   Effusion:   none   Impingement: negative   Empty Can:  negative   Speed's:  negative      Apprehension:  negative   Cross Arm Sign:  negative   [place]'s:  negative       Neer's:  negative      Belly Press Test:  negative      Drop Arm Test:  negative     Left Shoulder:  Crepitus:  no   Tenderness:  mild   Effusion:   non   Impingement: positive   Empty Can:  positive   Speed's: positive   Apprehension:  not tested   Cross Arm Sign:  positive   [place]'s:  positive      Neer's:  positive      Belly Press Test:  negative   Drop Arm Test:  positive           Imaging:  Mri Shoulder Left Wo Contrast    Result Date: 3/4/2020  LOCATION: 200 EXAM: MRI SHOULDER LEFT WO CONTRAST COMPARISON: None. HISTORY: Decreased range of motion, pain and spasms. TECHNIQUE: Routine multiplanar multisequence imaging was performed of the left shoulder. No contrast was administered. FINDINGS: Rotator cuff tendons insert normally. The [facility] joint is normal. A small amount of fluid in the subacromial bursa noted. Biceps tendon is not visualized at the level of the groove.  Small 2-3 mm paralabral cysts are identified at the posteroinferior labrum likely a posteroinferior labral tear. Normal muscle and bone marrow signal noted. 1. Nondisplaced tear of the posteroinferior labrum. 2. Nonvisualized biceps tendon may be due to a previous rupture. 3. Subacromial bursal fluid possibly bursitis. Hailee Renee was seen today for shoulder pain. Diagnoses and all orders for this visit:    Bursitis of left shoulder  -     ID ARTHROCENTESIS ASPIR&/INJ MAJOR JT/BURSA W/O US    Other orders  -     triamcinolone acetonide (KENALOG-40) injection 40 mg      Patient seen and examined. X-rays reviewed and MRI reviewed in detail with patient. Patient did not have a dislocation events and the posterior labral tear appears to be suspect at this time. Patient's pain is more consistent with a proximal biceps tendon tear which is quite unusual for his age. Regardless there is lack of tendon in the groove but also patient complains of catching and locking which may indicate remanent biceps anchor or tendon within the joint itself causing him more pain. Patient also has pain consistent with bursitis of the rotator cuff. Is most likely due to the original injury but also exacerbation of the chronic use and has line of work. Patient will do well with a diagnostic and therapeutic subacromial cortisone injection but he was also educated about possible surgical indications. At this point patient states that his motion has improved greatly but still needs to work on strength which is appropriate. Patient is offered physical therapy but he like to do it on his own and it appears appropriate to stay off work for another 4 weeks. He is requesting cortisone injection at this time. Procedure Note Cortisone Injection to Shoulder    The left shoulder was identified as the injection site. The risk and benefits of a cortisone injection were explained and the patient consented to the injection.  Under sterile conditions, the shoulder subacromial space was injected with a mixture of 40mg of Kenelog and Marcaine without complication. A sterile bandage was applied. Supriya Porter, DO        25 minutes was spent with patient. 50% or greater was spent counseling the patient. 57

## 2020-09-17 ENCOUNTER — OFFICE VISIT (OUTPATIENT)
Dept: ORTHOPEDIC SURGERY | Age: 35
End: 2020-09-17
Payer: COMMERCIAL

## 2020-09-17 VITALS — WEIGHT: 180 LBS | HEIGHT: 69 IN | BODY MASS INDEX: 26.66 KG/M2

## 2020-09-17 PROCEDURE — 99213 OFFICE O/P EST LOW 20 MIN: CPT | Performed by: ORTHOPAEDIC SURGERY

## 2020-09-17 NOTE — PROGRESS NOTES
Chief Complaint   Patient presents with    Shoulder Pain     Left shoulder follow up. Patient did have good relief with cortisone injetion         HPI:  Patient is 36 y.o. male complaining of left shoulder pain and weakness for 6 months since November 2019. He denies a specific traumatic injury to the left shoulder but felt a large pop in his left arm and shoulder blade after picking up a heavy garbage bin to dispose of as he is a . Patient stated he kept working but noticed gradual weakness and persistent pain especially at night and lifting heavy object especially over his head. Previous treatments include rest, ice, heat, NSAIDs, HEP without much relief. He denies any other orthopedic complaints. On follow-up today, patient states that his pain is well improved as well as range of motion. Patient states that he was doing home therapy exercises with her friend over the course of the pandemic lockdown and states that he is much improved. However patient states that he feels very shaky and not at full strength just yet at this point however he states he received good relief from cortisone injection. ROS:    Skin: (-) rash,(-) psoriasis,(-) eczema, (-)skin cancer. Neurologic: (-)numbness, (-)tingling, (-)headaches, (-) LOC. Cardiovascular: (-) Chest pain, (-) swelling in legs/feet, (-) SOB, (-) cramping in legs/feet with walking. All other review of systems negative except stated above or in HPI      No past medical history on file. Past Surgical History:   Procedure Laterality Date    FRACTURE SURGERY      rigth wrist    WRIST SURGERY      right. No current outpatient medications on file.   No Known Allergies  Social History     Socioeconomic History    Marital status:      Spouse name: Not on file    Number of children: Not on file    Years of education: Not on file    Highest education level: Not on file   Occupational History    Not on file   Social Needs    Financial resource strain: Not on file    Food insecurity     Worry: Not on file     Inability: Not on file   [employer] needs     Medical: Not on file     Non-medical: Not on file   Tobacco Use    Smoking status: Current Every Day Smoker     Packs/day: 0.20    Smokeless tobacco: Never Used   Substance and Sexual Activity    Alcohol use: Yes     Comment: occ.  Drug use: No    Sexual activity: Yes     Partners: Female   Lifestyle    Physical activity     Days per week: Not on file     Minutes per session: Not on file    Stress: Not on file   Relationships    Social connections     Talks on phone: Not on file     Gets together: Not on file     Attends [employer]ism service: Not on file     Active member of club or organization: Not on file     Attends meetings of clubs or organizations: Not on file     Relationship status: Not on file    Intimate partner violence     Fear of current or ex partner: Not on file     Emotionally abused: Not on file     Physically abused: Not on file     Forced sexual activity: Not on file   Other Topics Concern    Not on file   Social History Narrative    Not on file     No family history on file. Physical Exam:    Ht 5' 9\" (1.753 m)   Wt 180 lb (81.6 kg)   BMI 26.58 kg/m²     GENERAL: alert, appears stated age, cooperative, no acute distress    HEENT: Head is normocephalic, atraumatic. PERRLA. SKIN: Clean, dry, intact. There is not any cellulitis or cutaneous lesions noted in the upper extremities    PULMONARY: breathing is regular and unlabored, no acute distress    CV: The bilateral upper and lower extremities are warm and well-perfused with brisk capillary refill. 2+ pulses UE and LE bilateral.     PSYCHIATRY: Pleasant mood, appropriate behavior, follows commands    NEURO: Sensation is intact distally with light touch with no alteration.  Motor exam of the upper extremities show elbow flexion and extension, wrist flexion and extension, and finger abduction grossly intact 5/5. Upper extremity reflexes are bilaterally symmetrical and within normal limits. LYMPH: No lymphedema present distally in upper or lower extremity. MUSCULOSKELETAL:  Shoulder Exam:  Examination of the Left shoulder shows: There is not a deformity. There is not erythema. There is not soft tissue swelling. Deltoid region is  tender to palpation. AC Joint is  tender to palpation. Clavicle is not tender to palpation. Bicipital Groove is  tender to palpation. Pectoralis  is not tender to palpation. Scapula/ trapezius is  tender to palpation. Right:  ROM Full, Strength: Supraspinatus 5/5, Infraspinatus 5/5, Subscapularis 5/5  Left:  /140/60, Strength: Supraspinatus 4/5, Infraspinatus 5/5, Subscapularis 5/5  Right Shoulder:  Crepitus:  no   Tenderness:  none   Effusion:   none   Impingement: negative   Empty Can:  negative   Speed's:  negative      Apprehension:  negative   Cross Arm Sign:  negative   [place]'s:  negative       Neer's:  negative      Belly Press Test:  negative      Drop Arm Test:  negative     Left Shoulder:  Crepitus:  no   Tenderness:  mild   Effusion:   non   Impingement: positive   Empty Can:  positive   Speed's: positive   Apprehension:  not tested   Cross Arm Sign:  positive   [place]'s:  positive      Neer's:  positive      Belly Press Test:  negative   Drop Arm Test:  positive           Imaging:  Mri Shoulder Left Wo Contrast    Result Date: 3/4/2020  LOCATION: 200 EXAM: MRI SHOULDER LEFT WO CONTRAST COMPARISON: None. HISTORY: Decreased range of motion, pain and spasms. TECHNIQUE: Routine multiplanar multisequence imaging was performed of the left shoulder. No contrast was administered. FINDINGS: Rotator cuff tendons insert normally. The [facility] joint is normal. A small amount of fluid in the subacromial bursa noted. Biceps tendon is not visualized at the level of the groove.  Small 2-3 mm paralabral cysts are identified at the posteroinferior labrum likely a posteroinferior labral tear. Normal muscle and bone marrow signal noted. 1. Nondisplaced tear of the posteroinferior labrum. 2. Nonvisualized biceps tendon may be due to a previous rupture. 3. Subacromial bursal fluid possibly bursitis. Lisette Anton was seen today for shoulder pain. Diagnoses and all orders for this visit:    Bursitis of left shoulder    Biceps tendon tear      Patient seen and examined. X-rays reviewed and MRI reviewed in detail with patient. Patient did not have a dislocation events and the posterior labral tear appears to be suspect at this time. Patient's pain is more consistent with a proximal biceps tendon tear which is quite unusual for his age. Regardless there is lack of tendon in the groove but also patient complains of catching and locking which may indicate remanent biceps anchor or tendon within the joint itself causing him more pain. Patient also has pain consistent with bursitis of the rotator cuff. Is most likely due to the original injury but also exacerbation of the chronic use and has line of work. Patient will do well with a diagnostic and therapeutic subacromial cortisone injection but he was also educated about possible surgical indications. At this point patient states that his motion has improved greatly but still needs to work on strength which is appropriate. Patient is offered physical therapy but he like to do it on his own and it appears appropriate to stay off work for another 4 weeks.         Edwin Lopez, DO

## 2020-09-28 ENCOUNTER — TELEPHONE (OUTPATIENT)
Dept: ORTHOPEDIC SURGERY | Age: 35
End: 2020-09-28

## 2020-10-15 ENCOUNTER — OFFICE VISIT (OUTPATIENT)
Dept: ORTHOPEDIC SURGERY | Age: 35
End: 2020-10-15
Payer: COMMERCIAL

## 2020-10-15 VITALS — WEIGHT: 180 LBS | BODY MASS INDEX: 26.66 KG/M2 | HEIGHT: 69 IN

## 2020-10-15 PROCEDURE — 99213 OFFICE O/P EST LOW 20 MIN: CPT | Performed by: ORTHOPAEDIC SURGERY

## 2020-10-15 NOTE — LETTER
[address] [place] [id number]  Phone: [phone]  Fax: [phone] [facility],         October 15, 2020     Patient: Siri Amaral   YOB: 1985   Date of Visit: 10/15/2020       To Whom it May Concern:    Gustave Lennox was seen in my clinic on 10/15/2020. He may return to work on 10/19/2020. If you have any questions or concerns, please don't hesitate to call.     Sincerely,         Nacho Mcghee DO

## 2020-10-15 NOTE — PROGRESS NOTES
Chief Complaint   Patient presents with    Shoulder Pain     Left shoulder follow up. Patient would liek to remain to work. HPI:  Patient is 36 y.o. male complaining of left shoulder pain and weakness for 6 months since November 2019. He denies a specific traumatic injury to the left shoulder but felt a large pop in his left arm and shoulder blade after picking up a heavy garbage bin to dispose of as he is a . Patient stated he kept working but noticed gradual weakness and persistent pain especially at night and lifting heavy object especially over his head. Previous treatments include rest, ice, heat, NSAIDs, HEP without much relief. He denies any other orthopedic complaints. On follow-up today, patient states that his pain is well improved as well as range of motion. Patient states that he was doing home therapy exercises with her friend over the course of the pandemic lockdown and states that he is much improved. However patient states that he feels very shaky and not at full strength just yet at this point however he states he received good relief from cortisone injection. ROS:    Skin: (-) rash,(-) psoriasis,(-) eczema, (-)skin cancer. Neurologic: (-)numbness, (-)tingling, (-)headaches, (-) LOC. Cardiovascular: (-) Chest pain, (-) swelling in legs/feet, (-) SOB, (-) cramping in legs/feet with walking. All other review of systems negative except stated above or in HPI      No past medical history on file. Past Surgical History:   Procedure Laterality Date    FRACTURE SURGERY      rigth wrist    WRIST SURGERY      right. No current outpatient medications on file.   No Known Allergies  Social History     Socioeconomic History    Marital status:      Spouse name: Not on file    Number of children: Not on file    Years of education: Not on file    Highest education level: Not on file   Occupational History    Not on file   Social Needs    Financial resource strain: Not on file    Food insecurity     Worry: Not on file     Inability: Not on file    Transportation needs     Medical: Not on file     Non-medical: Not on file   Tobacco Use    Smoking status: Current Every Day Smoker     Packs/day: 0.20    Smokeless tobacco: Never Used   Substance and Sexual Activity    Alcohol use: Yes     Comment: occ.  Drug use: No    Sexual activity: Yes     Partners: Female   Lifestyle    Physical activity     Days per week: Not on file     Minutes per session: Not on file    Stress: Not on file   Relationships    Social connections     Talks on phone: Not on file     Gets together: Not on file     Attends Sabianist service: Not on file     Active member of club or organization: Not on file     Attends meetings of clubs or organizations: Not on file     Relationship status: Not on file    Intimate partner violence     Fear of current or ex partner: Not on file     Emotionally abused: Not on file     Physically abused: Not on file     Forced sexual activity: Not on file   Other Topics Concern    Not on file   Social History Narrative    Not on file     No family history on file. Physical Exam:    Ht 5' 9\" (1.753 m)   Wt 180 lb (81.6 kg)   BMI 26.58 kg/m²     GENERAL: alert, appears stated age, cooperative, no acute distress    HEENT: Head is normocephalic, atraumatic. PERRLA. SKIN: Clean, dry, intact. There is not any cellulitis or cutaneous lesions noted in the upper extremities    PULMONARY: breathing is regular and unlabored, no acute distress    CV: The bilateral upper and lower extremities are warm and well-perfused with brisk capillary refill. 2+ pulses UE and LE bilateral.     PSYCHIATRY: Pleasant mood, appropriate behavior, follows commands    NEURO: Sensation is intact distally with light touch with no alteration.  Motor exam of the upper extremities show elbow flexion and extension, wrist flexion and extension, and finger abduction grossly intact 5/5. Upper extremity reflexes are bilaterally symmetrical and within normal limits. LYMPH: No lymphedema present distally in upper or lower extremity. MUSCULOSKELETAL:  Shoulder Exam:  Examination of the Left shoulder shows: There is not a deformity. There is not erythema. There is not soft tissue swelling. Deltoid region is  tender to palpation. AC Joint is  tender to palpation. Clavicle is not tender to palpation. Bicipital Groove is  tender to palpation. Pectoralis  is not tender to palpation. Scapula/ trapezius is  tender to palpation. Right:  ROM Full, Strength: Supraspinatus 5/5, Infraspinatus 5/5, Subscapularis 5/5  Left:  /140/60, Strength: Supraspinatus 4/5, Infraspinatus 5/5, Subscapularis 5/5  Right Shoulder:  Crepitus:  no   Tenderness:  none   Effusion:   none   Impingement: negative   Empty Can:  negative   Speed's:  negative      Apprehension:  negative   Cross Arm Sign:  negative   [place]'s:  negative       Neer's:  negative      Belly Press Test:  negative      Drop Arm Test:  negative     Left Shoulder:  Crepitus:  no   Tenderness:  mild   Effusion:   non   Impingement: positive   Empty Can:  positive   Speed's: positive   Apprehension:  not tested   Cross Arm Sign:  positive   [place]'s:  positive      Neer's:  positive      Belly Press Test:  negative   Drop Arm Test:  positive           Imaging:  Mri Shoulder Left Wo Contrast    Result Date: 3/4/2020  LOCATION: 200 EXAM: MRI SHOULDER LEFT WO CONTRAST COMPARISON: None. HISTORY: Decreased range of motion, pain and spasms. TECHNIQUE: Routine multiplanar multisequence imaging was performed of the left shoulder. No contrast was administered. FINDINGS: Rotator cuff tendons insert normally. The [facility] joint is normal. A small amount of fluid in the subacromial bursa noted. Biceps tendon is not visualized at the level of the groove.  Small 2-3 mm paralabral cysts are identified at the posteroinferior labrum likely a